# Patient Record
Sex: FEMALE | Race: WHITE | NOT HISPANIC OR LATINO
[De-identification: names, ages, dates, MRNs, and addresses within clinical notes are randomized per-mention and may not be internally consistent; named-entity substitution may affect disease eponyms.]

---

## 2021-05-26 ENCOUNTER — TRANSCRIPTION ENCOUNTER (OUTPATIENT)
Age: 41
End: 2021-05-26

## 2023-11-10 PROBLEM — Z00.00 ENCOUNTER FOR PREVENTIVE HEALTH EXAMINATION: Status: ACTIVE | Noted: 2023-11-10

## 2024-01-10 ENCOUNTER — APPOINTMENT (OUTPATIENT)
Dept: ENDOCRINOLOGY | Facility: CLINIC | Age: 44
End: 2024-01-10

## 2024-05-26 ENCOUNTER — NON-APPOINTMENT (OUTPATIENT)
Age: 44
End: 2024-05-26

## 2024-06-19 ENCOUNTER — EMERGENCY (EMERGENCY)
Facility: HOSPITAL | Age: 44
LOS: 1 days | Discharge: HOME | End: 2024-06-19
Attending: EMERGENCY MEDICINE
Payer: COMMERCIAL

## 2024-06-19 VITALS
WEIGHT: 164.91 LBS | RESPIRATION RATE: 16 BRPM | TEMPERATURE: 99 F | SYSTOLIC BLOOD PRESSURE: 132 MMHG | DIASTOLIC BLOOD PRESSURE: 87 MMHG | HEART RATE: 93 BPM | HEIGHT: 65 IN | OXYGEN SATURATION: 98 %

## 2024-06-19 VITALS
SYSTOLIC BLOOD PRESSURE: 138 MMHG | DIASTOLIC BLOOD PRESSURE: 84 MMHG | HEART RATE: 86 BPM | OXYGEN SATURATION: 98 % | RESPIRATION RATE: 18 BRPM | TEMPERATURE: 98 F

## 2024-06-19 LAB
ALBUMIN SERPL ELPH-MCNC: 4.6 G/DL — SIGNIFICANT CHANGE UP (ref 3.3–5)
ALP SERPL-CCNC: 59 U/L — SIGNIFICANT CHANGE UP (ref 40–120)
ALT FLD-CCNC: 20 U/L — SIGNIFICANT CHANGE UP (ref 10–45)
ANION GAP SERPL CALC-SCNC: 13 MMOL/L — SIGNIFICANT CHANGE UP (ref 5–17)
APPEARANCE UR: CLEAR — SIGNIFICANT CHANGE UP
AST SERPL-CCNC: 17 U/L — SIGNIFICANT CHANGE UP (ref 10–40)
BACTERIA # UR AUTO: ABNORMAL /HPF
BASOPHILS # BLD AUTO: 0.05 K/UL — SIGNIFICANT CHANGE UP (ref 0–0.2)
BASOPHILS NFR BLD AUTO: 0.5 % — SIGNIFICANT CHANGE UP (ref 0–2)
BILIRUB SERPL-MCNC: 0.6 MG/DL — SIGNIFICANT CHANGE UP (ref 0.2–1.2)
BILIRUB UR-MCNC: NEGATIVE — SIGNIFICANT CHANGE UP
BUN SERPL-MCNC: 8 MG/DL — SIGNIFICANT CHANGE UP (ref 7–23)
CALCIUM SERPL-MCNC: 9.7 MG/DL — SIGNIFICANT CHANGE UP (ref 8.4–10.5)
CAST: 0 /LPF — SIGNIFICANT CHANGE UP (ref 0–4)
CHLORIDE SERPL-SCNC: 105 MMOL/L — SIGNIFICANT CHANGE UP (ref 96–108)
CO2 SERPL-SCNC: 22 MMOL/L — SIGNIFICANT CHANGE UP (ref 22–31)
COLOR SPEC: YELLOW — SIGNIFICANT CHANGE UP
CREAT SERPL-MCNC: 0.65 MG/DL — SIGNIFICANT CHANGE UP (ref 0.5–1.3)
DIFF PNL FLD: NEGATIVE — SIGNIFICANT CHANGE UP
EGFR: 113 ML/MIN/1.73M2 — SIGNIFICANT CHANGE UP
EOSINOPHIL # BLD AUTO: 0.01 K/UL — SIGNIFICANT CHANGE UP (ref 0–0.5)
EOSINOPHIL NFR BLD AUTO: 0.1 % — SIGNIFICANT CHANGE UP (ref 0–6)
GLUCOSE SERPL-MCNC: 99 MG/DL — SIGNIFICANT CHANGE UP (ref 70–99)
GLUCOSE UR QL: NEGATIVE MG/DL — SIGNIFICANT CHANGE UP
HCG SERPL-ACNC: <2 MIU/ML — SIGNIFICANT CHANGE UP
HCT VFR BLD CALC: 43.7 % — SIGNIFICANT CHANGE UP (ref 34.5–45)
HGB BLD-MCNC: 14.2 G/DL — SIGNIFICANT CHANGE UP (ref 11.5–15.5)
IMM GRANULOCYTES NFR BLD AUTO: 0.5 % — SIGNIFICANT CHANGE UP (ref 0–0.9)
KETONES UR-MCNC: NEGATIVE MG/DL — SIGNIFICANT CHANGE UP
LEUKOCYTE ESTERASE UR-ACNC: NEGATIVE — SIGNIFICANT CHANGE UP
LIDOCAIN IGE QN: 60 U/L — SIGNIFICANT CHANGE UP (ref 7–60)
LYMPHOCYTES # BLD AUTO: 1.68 K/UL — SIGNIFICANT CHANGE UP (ref 1–3.3)
LYMPHOCYTES # BLD AUTO: 16.7 % — SIGNIFICANT CHANGE UP (ref 13–44)
MCHC RBC-ENTMCNC: 30.3 PG — SIGNIFICANT CHANGE UP (ref 27–34)
MCHC RBC-ENTMCNC: 32.5 GM/DL — SIGNIFICANT CHANGE UP (ref 32–36)
MCV RBC AUTO: 93.2 FL — SIGNIFICANT CHANGE UP (ref 80–100)
MONOCYTES # BLD AUTO: 0.66 K/UL — SIGNIFICANT CHANGE UP (ref 0–0.9)
MONOCYTES NFR BLD AUTO: 6.6 % — SIGNIFICANT CHANGE UP (ref 2–14)
NEUTROPHILS # BLD AUTO: 7.59 K/UL — HIGH (ref 1.8–7.4)
NEUTROPHILS NFR BLD AUTO: 75.6 % — SIGNIFICANT CHANGE UP (ref 43–77)
NITRITE UR-MCNC: NEGATIVE — SIGNIFICANT CHANGE UP
NRBC # BLD: 0 /100 WBCS — SIGNIFICANT CHANGE UP (ref 0–0)
PH UR: 7 — SIGNIFICANT CHANGE UP (ref 5–8)
PLATELET # BLD AUTO: 284 K/UL — SIGNIFICANT CHANGE UP (ref 150–400)
POTASSIUM SERPL-MCNC: 4.2 MMOL/L — SIGNIFICANT CHANGE UP (ref 3.5–5.3)
POTASSIUM SERPL-SCNC: 4.2 MMOL/L — SIGNIFICANT CHANGE UP (ref 3.5–5.3)
PROT SERPL-MCNC: 8 G/DL — SIGNIFICANT CHANGE UP (ref 6–8.3)
PROT UR-MCNC: NEGATIVE MG/DL — SIGNIFICANT CHANGE UP
RBC # BLD: 4.69 M/UL — SIGNIFICANT CHANGE UP (ref 3.8–5.2)
RBC # FLD: 13 % — SIGNIFICANT CHANGE UP (ref 10.3–14.5)
RBC CASTS # UR COMP ASSIST: 1 /HPF — SIGNIFICANT CHANGE UP (ref 0–4)
SODIUM SERPL-SCNC: 140 MMOL/L — SIGNIFICANT CHANGE UP (ref 135–145)
SP GR SPEC: <1.005 — LOW (ref 1–1.03)
SQUAMOUS # UR AUTO: 1 /HPF — SIGNIFICANT CHANGE UP (ref 0–5)
T4 AB SER-ACNC: 6.8 UG/DL — SIGNIFICANT CHANGE UP (ref 4.6–12)
TSH SERPL-MCNC: 3.13 UIU/ML — SIGNIFICANT CHANGE UP (ref 0.27–4.2)
UROBILINOGEN FLD QL: 0.2 MG/DL — SIGNIFICANT CHANGE UP (ref 0.2–1)
WBC # BLD: 10.04 K/UL — SIGNIFICANT CHANGE UP (ref 3.8–10.5)
WBC # FLD AUTO: 10.04 K/UL — SIGNIFICANT CHANGE UP (ref 3.8–10.5)
WBC UR QL: 1 /HPF — SIGNIFICANT CHANGE UP (ref 0–5)

## 2024-06-19 PROCEDURE — 84436 ASSAY OF TOTAL THYROXINE: CPT

## 2024-06-19 PROCEDURE — 99284 EMERGENCY DEPT VISIT MOD MDM: CPT | Mod: 25

## 2024-06-19 PROCEDURE — 84443 ASSAY THYROID STIM HORMONE: CPT

## 2024-06-19 PROCEDURE — 80053 COMPREHEN METABOLIC PANEL: CPT

## 2024-06-19 PROCEDURE — 84702 CHORIONIC GONADOTROPIN TEST: CPT

## 2024-06-19 PROCEDURE — 83690 ASSAY OF LIPASE: CPT

## 2024-06-19 PROCEDURE — 99284 EMERGENCY DEPT VISIT MOD MDM: CPT

## 2024-06-19 PROCEDURE — 76705 ECHO EXAM OF ABDOMEN: CPT | Mod: 26

## 2024-06-19 PROCEDURE — 81001 URINALYSIS AUTO W/SCOPE: CPT

## 2024-06-19 PROCEDURE — 36415 COLL VENOUS BLD VENIPUNCTURE: CPT

## 2024-06-19 PROCEDURE — 85025 COMPLETE CBC W/AUTO DIFF WBC: CPT

## 2024-06-19 PROCEDURE — 76705 ECHO EXAM OF ABDOMEN: CPT

## 2024-06-19 NOTE — ED ADULT NURSE REASSESSMENT NOTE - NS ED NURSE REASSESS COMMENT FT1
1310 Pt A&Ox4. No c/o pain. IVL intact without sx ofinfilt. Awaiting ultrasound.  at [facility]

## 2024-06-19 NOTE — ED PROVIDER NOTE - PROGRESS NOTE DETAILS
results of the labs and US discussed with pt. informed about the 2 mm gallbladder polyp and follow up with GI, strict return precautions advised. Case discussed with Dr. Baca. -RAMILA Edmond

## 2024-06-19 NOTE — ED PROVIDER NOTE - PATIENT PORTAL LINK FT
You can access the FollowMyHealth Patient Portal offered by [facility] by registering at the following website: [email or web address]. By joining [employer]’s FollowMyHealth portal, you will also be able to view your health information using other applications (apps) compatible with our system.

## 2024-06-19 NOTE — ED ADULT NURSE NOTE - DISCHARGE DATE/TIME
19-Jun-2024 17:10 Spironolactone Pregnancy And Lactation Text: This medication can cause feminization of the male fetus and should be avoided during pregnancy. The active metabolite is also found in breast milk.

## 2024-06-19 NOTE — ED PROVIDER NOTE - ATTENDING APP SHARED VISIT CONTRIBUTION OF CARE
Patient is a 43-year-old Presenting with intermittent abdominal pains in her upper abdomen worse after eating no history of gallbladder disease states her symptoms are related to long COVID is never seen GI about an appointment 2 months has not been taking medications for but says she has had a recent 7 pound weight loss no nausea vomiting fevers or chills at this time abdomen soft nontender

## 2024-06-19 NOTE — ED ADULT NURSE NOTE - OBJECTIVE STATEMENT
43y female, AAOx4, denies PMH, presents to ED for 1 year of "poor PO intake" pt states that covid "decreased by hunger and I have not been eating", also reports urinary frequency, nausea, pt reports that over the last several weeks pt has felt less hungry, pt reports GI appt in a few weeks but presents today for blood work and imagining to rule out something "dangerous', denies headache, chest pain, shortness of breath, pt independent at baseline, able to void, skin CDI, placed in gown, side rails up for safety, bed in lowest position, call bell within reach, patient and family educated on plan of care, comfort and safety provided.

## 2024-06-19 NOTE — ED PROVIDER NOTE - NSFOLLOWUPINSTRUCTIONS_ED_ALL_ED_FT
Follow up with Gastroenterology this week for outpatient management.; please inform your GI doctor about your 2 mm gallbladder polyp. Eat smaller meals more frequently.  Worsening pain, new fever, chills, nausea, vomiting, new chest pain/shortness of breath return to ER

## 2024-06-19 NOTE — ED PROVIDER NOTE - OBJECTIVE STATEMENT
43yof no pmhx here for decrease PO for past few months. pt reports covid about last year and since then lost "appetite trigger" and just not hungry anymore. and for past month with intermittent nausea and RUQ pain. No vomiting. no diarrhea. Reports pending GI workup in July but concerned since lost about 7lbs in month and a half.

## 2024-06-27 ENCOUNTER — TRANSCRIPTION ENCOUNTER (OUTPATIENT)
Age: 44
End: 2024-06-27

## 2024-08-04 PROBLEM — U09.9 POST COVID-19 CONDITION, UNSPECIFIED: Chronic | Status: ACTIVE | Noted: 2024-06-19

## 2024-11-25 ENCOUNTER — APPOINTMENT (OUTPATIENT)
Dept: HUMAN REPRODUCTION | Facility: CLINIC | Age: 44
End: 2024-11-25
Payer: COMMERCIAL

## 2024-11-25 PROCEDURE — 99205 OFFICE O/P NEW HI 60 MIN: CPT | Mod: 25

## 2024-11-25 PROCEDURE — 36415 COLL VENOUS BLD VENIPUNCTURE: CPT

## 2024-11-25 PROCEDURE — 76830 TRANSVAGINAL US NON-OB: CPT

## 2024-11-29 ENCOUNTER — NON-APPOINTMENT (OUTPATIENT)
Age: 44
End: 2024-11-29

## 2024-12-05 ENCOUNTER — OUTPATIENT (OUTPATIENT)
Dept: OUTPATIENT SERVICES | Facility: HOSPITAL | Age: 44
LOS: 1 days | End: 2024-12-05

## 2024-12-05 NOTE — CHART NOTE - NSCHARTNOTEFT_GEN_A_CORE
HSG cancelled as pt reports having sexual intercourse on day 6 of her cycle. LMP 11/26-12/1. Urine HCG: Negative.  As per policy, will defer HSG at this time. Pt was advised to reschedule.

## 2025-01-02 ENCOUNTER — RESULT REVIEW (OUTPATIENT)
Age: 45
End: 2025-01-02

## 2025-01-02 ENCOUNTER — OUTPATIENT (OUTPATIENT)
Dept: OUTPATIENT SERVICES | Facility: HOSPITAL | Age: 45
LOS: 1 days | End: 2025-01-02
Payer: COMMERCIAL

## 2025-01-02 ENCOUNTER — APPOINTMENT (OUTPATIENT)
Dept: RADIOLOGY | Facility: HOSPITAL | Age: 45
End: 2025-01-02

## 2025-01-02 DIAGNOSIS — N97.9 FEMALE INFERTILITY, UNSPECIFIED: ICD-10-CM

## 2025-01-02 PROCEDURE — 74740 X-RAY FEMALE GENITAL TRACT: CPT

## 2025-01-02 PROCEDURE — 58340 CATHETER FOR HYSTEROGRAPHY: CPT

## 2025-01-02 PROCEDURE — 74740 X-RAY FEMALE GENITAL TRACT: CPT | Mod: 26

## 2025-06-07 ENCOUNTER — NON-APPOINTMENT (OUTPATIENT)
Age: 45
End: 2025-06-07

## 2025-06-13 ENCOUNTER — APPOINTMENT (OUTPATIENT)
Dept: ORTHOPEDIC SURGERY | Facility: CLINIC | Age: 45
End: 2025-06-13

## 2025-06-13 PROBLEM — M54.2 CERVICALGIA: Status: ACTIVE | Noted: 2025-06-13

## 2025-09-07 ENCOUNTER — NON-APPOINTMENT (OUTPATIENT)
Age: 45
End: 2025-09-07